# Patient Record
Sex: MALE | Race: BLACK OR AFRICAN AMERICAN | HISPANIC OR LATINO | URBAN - METROPOLITAN AREA
[De-identification: names, ages, dates, MRNs, and addresses within clinical notes are randomized per-mention and may not be internally consistent; named-entity substitution may affect disease eponyms.]

---

## 2020-09-25 ENCOUNTER — LAB OUTSIDE AN ENCOUNTER (OUTPATIENT)
Dept: URBAN - METROPOLITAN AREA CLINIC 82 | Facility: CLINIC | Age: 65
End: 2020-09-25

## 2020-09-25 ENCOUNTER — OFFICE VISIT (OUTPATIENT)
Dept: URBAN - METROPOLITAN AREA CLINIC 82 | Facility: CLINIC | Age: 65
End: 2020-09-25
Payer: MEDICARE

## 2020-09-25 DIAGNOSIS — G51.0 BELL'S PALSY: ICD-10-CM

## 2020-09-25 DIAGNOSIS — R13.14 PHARYNGOESOPHAGEAL DYSPHAGIA: ICD-10-CM

## 2020-09-25 DIAGNOSIS — Z86.010 PERSONAL HISTORY OF COLONIC POLYPS: ICD-10-CM

## 2020-09-25 PROBLEM — 193093009: Status: ACTIVE | Noted: 2020-09-25

## 2020-09-25 PROCEDURE — 3017F COLORECTAL CA SCREEN DOC REV: CPT | Performed by: INTERNAL MEDICINE

## 2020-09-25 PROCEDURE — G8417 CALC BMI ABV UP PARAM F/U: HCPCS | Performed by: INTERNAL MEDICINE

## 2020-09-25 PROCEDURE — 99203 OFFICE O/P NEW LOW 30 MIN: CPT | Performed by: INTERNAL MEDICINE

## 2020-09-25 PROCEDURE — G8427 DOCREV CUR MEDS BY ELIG CLIN: HCPCS | Performed by: INTERNAL MEDICINE

## 2020-09-25 NOTE — HPI-TODAY'S VISIT:
65 yo HM from  is here accompanied by her daughter who translates here for new c/o choaking spells. He had hx of Bellspalsy 2 years ago but denies hx of stroke,  He apparently had near syncopy at [facility]. He was taken to [facility] and reports normal CT scan. He has neurology follow up as OP.

## 2020-09-25 NOTE — PHYSICAL EXAM HENT:
Head, normocephalic, atraumatic, Face deviated mouth and less fold on the left side, Ears, External ears within normal limits, Nose/Nasopharynx, External nose normal appearance, nares patent, no nasal discharge, Mouth and Throat, Oral cavity appearance normal, Breath odor normal, Lips, Appearance normal

## 2020-10-22 ENCOUNTER — OFFICE VISIT (OUTPATIENT)
Dept: URBAN - METROPOLITAN AREA SURGERY CENTER 13 | Facility: SURGERY CENTER | Age: 65
End: 2020-10-22
Payer: MEDICARE

## 2020-10-22 ENCOUNTER — CLAIMS CREATED FROM THE CLAIM WINDOW (OUTPATIENT)
Dept: URBAN - METROPOLITAN AREA CLINIC 4 | Facility: CLINIC | Age: 65
End: 2020-10-22
Payer: MEDICARE

## 2020-10-22 DIAGNOSIS — K22.2 ACQUIRED ESOPHAGEAL RING: ICD-10-CM

## 2020-10-22 DIAGNOSIS — B96.81 HELICOBACTER PYLORI [H. PYLORI] AS THE CAUSE OF DISEASES CLASSIFIED ELSEWHERE: ICD-10-CM

## 2020-10-22 DIAGNOSIS — B96.81 BACTERIAL INFECTION DUE TO H. PYLORI: ICD-10-CM

## 2020-10-22 DIAGNOSIS — K29.60 ADENOPAPILLOMATOSIS GASTRICA: ICD-10-CM

## 2020-10-22 DIAGNOSIS — K29.60 OTHER GASTRITIS WITHOUT BLEEDING: ICD-10-CM

## 2020-10-22 DIAGNOSIS — R13.19 CERVICAL DYSPHAGIA: ICD-10-CM

## 2020-10-22 DIAGNOSIS — K30 ACID INDIGESTION: ICD-10-CM

## 2020-10-22 PROCEDURE — 43239 EGD BIOPSY SINGLE/MULTIPLE: CPT | Performed by: INTERNAL MEDICINE

## 2020-10-22 PROCEDURE — G8907 PT DOC NO EVENTS ON DISCHARG: HCPCS | Performed by: INTERNAL MEDICINE

## 2020-10-22 PROCEDURE — 88342 IMHCHEM/IMCYTCHM 1ST ANTB: CPT | Performed by: PATHOLOGY

## 2020-10-22 PROCEDURE — 88305 TISSUE EXAM BY PATHOLOGIST: CPT | Performed by: PATHOLOGY

## 2020-10-22 PROCEDURE — 43248 EGD GUIDE WIRE INSERTION: CPT | Performed by: INTERNAL MEDICINE

## 2020-10-30 ENCOUNTER — TELEPHONE ENCOUNTER (OUTPATIENT)
Dept: URBAN - METROPOLITAN AREA CLINIC 92 | Facility: CLINIC | Age: 65
End: 2020-10-30

## 2020-10-30 RX ORDER — AMOXICILLIN 500 MG/1
2 CAPSULES CAPSULE ORAL TWICE A DAY
Qty: 56 CAPSULE | Refills: 0 | OUTPATIENT
Start: 2020-10-30 | End: 2020-11-12

## 2020-10-30 RX ORDER — OMEPRAZOLE 20 MG/1
1 CAPSULE 30 MINUTES BEFORE MORNING MEAL CAPSULE, DELAYED RELEASE ORAL
Qty: 60 CAPSULE | Refills: 0 | OUTPATIENT
Start: 2020-11-04

## 2020-10-30 RX ORDER — CLARITHROMYCIN 500 MG/1
1 TABLET TABLET, FILM COATED ORAL
Qty: 28 TABLET | Refills: 0 | OUTPATIENT
Start: 2020-10-30 | End: 2020-11-12

## 2022-04-29 ENCOUNTER — LAB OUTSIDE AN ENCOUNTER (OUTPATIENT)
Dept: URBAN - METROPOLITAN AREA CLINIC 35 | Facility: CLINIC | Age: 67
End: 2022-04-29

## 2022-04-29 ENCOUNTER — OFFICE VISIT (OUTPATIENT)
Dept: URBAN - METROPOLITAN AREA CLINIC 35 | Facility: CLINIC | Age: 67
End: 2022-04-29
Payer: MEDICARE

## 2022-04-29 VITALS
DIASTOLIC BLOOD PRESSURE: 80 MMHG | HEART RATE: 62 BPM | BODY MASS INDEX: 34.53 KG/M2 | OXYGEN SATURATION: 96 % | HEIGHT: 67 IN | SYSTOLIC BLOOD PRESSURE: 132 MMHG | WEIGHT: 220 LBS

## 2022-04-29 DIAGNOSIS — R13.14 PHARYNGOESOPHAGEAL DYSPHAGIA: ICD-10-CM

## 2022-04-29 DIAGNOSIS — G51.0 BELL'S PALSY: ICD-10-CM

## 2022-04-29 DIAGNOSIS — K44.9 HIATAL HERNIA: ICD-10-CM

## 2022-04-29 DIAGNOSIS — Z86.010 PERSONAL HISTORY OF COLONIC POLYPS: ICD-10-CM

## 2022-04-29 PROBLEM — 40739000: Status: ACTIVE | Noted: 2022-04-29

## 2022-04-29 PROCEDURE — 99214 OFFICE O/P EST MOD 30 MIN: CPT | Performed by: INTERNAL MEDICINE

## 2022-04-29 RX ORDER — OMEPRAZOLE 20 MG/1
1 CAPSULE 30 MINUTES BEFORE MORNING MEAL CAPSULE, DELAYED RELEASE ORAL
Qty: 60 CAPSULE | Refills: 0 | Status: ACTIVE | COMMUNITY
Start: 2020-11-04

## 2022-04-29 NOTE — HPI-REGURGITATION
66 year old male presents today for a consultation for regurgitation.  Onset was about 3 days ago. Symptoms occur with solids foods only and liquids. Patient has a history of [place] palsy in 2018. Denies hx of stroke. He has seen a Neurologist. Patient has tried Pepto-Bismol with relief of symptoms.  Patient admits dysphagia especially with liquids and with his own saliva, globus, sour eructations, bloating/gas, indigestion. Patient states that they have not had any recent changes in their medications. Patient denies early satiety, changes in appetite, coughing, abdominal/epigastric pain, or changes in bowel habits. Denies a family history of esophageal and gastric cancers and/or diseases. Admits a family history of colon cancer.

## 2022-05-19 ENCOUNTER — OFFICE VISIT (OUTPATIENT)
Dept: URBAN - METROPOLITAN AREA CLINIC 35 | Facility: CLINIC | Age: 67
End: 2022-05-19

## 2022-08-23 ENCOUNTER — DASHBOARD ENCOUNTERS (OUTPATIENT)
Age: 67
End: 2022-08-23

## 2022-08-23 ENCOUNTER — OFFICE VISIT (OUTPATIENT)
Dept: URBAN - METROPOLITAN AREA CLINIC 35 | Facility: CLINIC | Age: 67
End: 2022-08-23
Payer: MEDICARE

## 2022-08-23 VITALS
SYSTOLIC BLOOD PRESSURE: 128 MMHG | WEIGHT: 215 LBS | DIASTOLIC BLOOD PRESSURE: 85 MMHG | HEART RATE: 72 BPM | HEIGHT: 67 IN | OXYGEN SATURATION: 96 % | BODY MASS INDEX: 33.74 KG/M2

## 2022-08-23 DIAGNOSIS — K59.09 CHRONIC CONSTIPATION: ICD-10-CM

## 2022-08-23 DIAGNOSIS — K57.30 DIVERTICULOSIS OF COLON: ICD-10-CM

## 2022-08-23 DIAGNOSIS — Z86.010 HX OF ADENOMATOUS COLONIC POLYPS: ICD-10-CM

## 2022-08-23 DIAGNOSIS — K21.9 GASTROESOPHAGEAL REFLUX DISEASE, UNSPECIFIED WHETHER ESOPHAGITIS PRESENT: ICD-10-CM

## 2022-08-23 PROBLEM — 429047008: Status: ACTIVE | Noted: 2022-08-23

## 2022-08-23 PROBLEM — 733657002: Status: ACTIVE | Noted: 2022-08-23

## 2022-08-23 PROBLEM — 235595009: Status: ACTIVE | Noted: 2022-08-23

## 2022-08-23 PROCEDURE — 99215 OFFICE O/P EST HI 40 MIN: CPT | Performed by: INTERNAL MEDICINE

## 2022-08-23 RX ORDER — HYDROCHLOROTHIAZIDE 25 MG/1
1 TABLET IN THE MORNING TABLET ORAL
Status: ACTIVE | COMMUNITY

## 2022-08-23 RX ORDER — LISINOPRIL 20 MG/1
1 TABLET TABLET ORAL
Status: ACTIVE | COMMUNITY

## 2022-08-23 RX ORDER — OMEPRAZOLE 20 MG/1
1 CAPSULE 30 MINUTES BEFORE MORNING MEAL CAPSULE, DELAYED RELEASE ORAL
Qty: 60 CAPSULE | Refills: 0 | Status: DISCONTINUED | COMMUNITY
Start: 2020-11-04

## 2022-08-23 RX ORDER — ALLOPURINOL 100 MG/1
1 TABLET TABLET ORAL
Status: ACTIVE | COMMUNITY

## 2022-08-23 NOTE — HPI-TODAY'S VISIT:
Patient is a 66-year-old male and presents today for consultation for a colonoscopy. He has a personal history of colonic polyps and his last colonoscopy was 2020, revealing begin polyps. Patient denies a family history of colon polyps and cancers. Patient currently admits 1 bowel movements a day without strain. Stools are normal. Patient denies any mucus, blood, or melena.  Patient has NL BM's 1-2 a day, with good emptying but sometimes does have to strain. No blood in stool. No melena. No abdominal pain  Colonoscopy in 2015: 1 TA removed from TC  Last colonoscopy 1/7/2020:  described as an extremelly difficult colonoscopy due to multiple diverticuli, restrictive mobility of colon and significant looping. Cecum though was reached and bowel prep was good. 2 mm polyp in DC which came back HP. Multiple diverticuli in SC, DC and TC. External and Internal Hemorrhoids.  Repeat colonoscopy in 5 yrs was recommended then.

## 2022-11-15 ENCOUNTER — OFFICE VISIT (OUTPATIENT)
Dept: URBAN - METROPOLITAN AREA CLINIC 35 | Facility: CLINIC | Age: 67
End: 2022-11-15

## 2025-06-27 ENCOUNTER — LAB OUTSIDE AN ENCOUNTER (OUTPATIENT)
Dept: URBAN - METROPOLITAN AREA CLINIC 31 | Facility: CLINIC | Age: 70
End: 2025-06-27

## 2025-06-27 ENCOUNTER — OFFICE VISIT (OUTPATIENT)
Dept: URBAN - METROPOLITAN AREA CLINIC 31 | Facility: CLINIC | Age: 70
End: 2025-06-27

## 2025-06-27 RX ORDER — LISINOPRIL 20 MG/1
1 TABLET TABLET ORAL
Status: ACTIVE | COMMUNITY

## 2025-06-27 RX ORDER — ALLOPURINOL 100 MG/1
1 TABLET TABLET ORAL
Status: ON HOLD | COMMUNITY

## 2025-06-27 RX ORDER — HYDROCHLOROTHIAZIDE 25 MG/1
1 TABLET IN THE MORNING TABLET ORAL
Status: ACTIVE | COMMUNITY

## 2025-06-27 NOTE — HPI-TODAY'S VISIT:
A 69 year old male patient with previous history of Adenomatous polyps, Diverticulosis, Chronic constipation, GERD presents today for his recall colonoscopy. Brother was diagnosed with colon cancer at age 66. Patient currently mentions 5 bowel movements per week with solid consistency without blood melena or pain. Patient takes miralax PRN. Patient denies taking any blood thinner, semaglutide. Denies any recent fever, abdominal pain, unwanted weight loss.   Last Appointment - 8/2022 OTC Fiber supplement OTC Miralax PM PRN OTC Pepcid 20 mg PRN  COL due 1/2025

## 2025-08-01 ENCOUNTER — CLAIMS CREATED FROM THE CLAIM WINDOW (OUTPATIENT)
Dept: URBAN - METROPOLITAN AREA SURGERY CENTER 8 | Facility: SURGERY CENTER | Age: 70
End: 2025-08-01
Payer: COMMERCIAL

## 2025-08-01 ENCOUNTER — CLAIMS CREATED FROM THE CLAIM WINDOW (OUTPATIENT)
Dept: URBAN - METROPOLITAN AREA CLINIC 4 | Facility: CLINIC | Age: 70
End: 2025-08-01
Payer: COMMERCIAL

## 2025-08-01 DIAGNOSIS — Z09 ENCOUNTER FOR FOLLOW-UP EXAMINATION AFTER COMPLETED TREATMENT FOR CONDITIONS OTHER THAN MALIGNANT NEOPLASM: ICD-10-CM

## 2025-08-01 DIAGNOSIS — Z86.0100 PERSONAL HISTORY OF COLON POLYPS, UNSPECIFIED: ICD-10-CM

## 2025-08-01 DIAGNOSIS — K63.5 HYPERPLASTIC COLON POLYPS: ICD-10-CM

## 2025-08-01 DIAGNOSIS — K63.5 POLYP OF COLON: ICD-10-CM

## 2025-08-01 PROCEDURE — 45380 COLONOSCOPY AND BIOPSY: CPT | Performed by: INTERNAL MEDICINE

## 2025-08-01 PROCEDURE — 0529F INTRVL 3/>YR PTS CLNSCP DOCD: CPT | Performed by: INTERNAL MEDICINE

## 2025-08-01 PROCEDURE — 45385 COLONOSCOPY W/LESION REMOVAL: CPT | Performed by: INTERNAL MEDICINE

## 2025-08-01 PROCEDURE — 88305 TISSUE EXAM BY PATHOLOGIST: CPT | Performed by: PATHOLOGY

## 2025-08-01 PROCEDURE — 00811 ANES LWR INTST NDSC NOS: CPT | Performed by: REGISTERED NURSE

## 2025-08-01 RX ORDER — HYDROCHLOROTHIAZIDE 25 MG/1
1 TABLET IN THE MORNING TABLET ORAL
Status: ACTIVE | COMMUNITY

## 2025-08-01 RX ORDER — LISINOPRIL 20 MG/1
1 TABLET TABLET ORAL
Status: ACTIVE | COMMUNITY

## 2025-08-01 RX ORDER — ALLOPURINOL 100 MG/1
1 TABLET TABLET ORAL
Status: ON HOLD | COMMUNITY

## 2025-08-19 ENCOUNTER — OFFICE VISIT (OUTPATIENT)
Dept: URBAN - METROPOLITAN AREA TELEHEALTH 2 | Facility: TELEHEALTH | Age: 70
End: 2025-08-19
Payer: COMMERCIAL

## 2025-08-19 ENCOUNTER — TELEPHONE ENCOUNTER (OUTPATIENT)
Dept: URBAN - METROPOLITAN AREA CLINIC 35 | Facility: CLINIC | Age: 70
End: 2025-08-19

## 2025-08-19 DIAGNOSIS — Z86.0100 PERSONAL HISTORY OF COLONIC POLYPS: ICD-10-CM

## 2025-08-19 DIAGNOSIS — K57.30 DIVERTICULOSIS OF COLON: ICD-10-CM

## 2025-08-19 DIAGNOSIS — K59.09 CHRONIC CONSTIPATION: ICD-10-CM

## 2025-08-19 PROCEDURE — 99213 OFFICE O/P EST LOW 20 MIN: CPT | Performed by: INTERNAL MEDICINE

## 2025-08-19 RX ORDER — ALLOPURINOL 100 MG/1
1 TABLET TABLET ORAL
Status: ON HOLD | COMMUNITY

## 2025-08-19 RX ORDER — HYDROCHLOROTHIAZIDE 25 MG/1
1 TABLET IN THE MORNING TABLET ORAL
Status: ACTIVE | COMMUNITY

## 2025-08-19 RX ORDER — LISINOPRIL 20 MG/1
1 TABLET TABLET ORAL
Status: ACTIVE | COMMUNITY